# Patient Record
(demographics unavailable — no encounter records)

---

## 2024-12-12 NOTE — ASSESSMENT
[FreeTextEntry1] :  in summary, the patient is a 54-year-old woman who has had SVT in the past and had a cold ablation approximately 16 years ago.  She has had wide-complex tachycardia on monitoring a year ago.  She in general does have periods of sinus tachycardia.  She did stop taking methadone approximately a week ago and may indeed be having occasional withdrawal.  For now would continue to observe she will take metoprolol succinate 25 mg twice per day  If symptoms and tachycardia return may need to consider repeat monitoring with an eye towards repeat ablation

## 2024-12-12 NOTE — REASON FOR VISIT
[Arrhythmia/ECG Abnorrmalities] : arrhythmia/ECG abnormalities [FreeTextEntry1] :   Patient returns for follow-up.  For the most part she has been doing better but states she had an incident yesterday where she went to  her dog and all of a sudden found her heart rate at 192.  She states she sat down and after a few minutes it went back down

## 2024-12-16 NOTE — HISTORY OF PRESENT ILLNESS
[de-identified] : 54 year old female hx chronic nasal congestion  LCV 6/14/24 at which time Azelastine added to INCS

## 2024-12-17 NOTE — HISTORY OF PRESENT ILLNESS
[FreeTextEntry1] : Annual wellness exam. [de-identified] : Patient is a 54-year-old female who presents today for annual wellness exam.  Medical history significant for asthma, bladder atonia, chronic pain syndrome, chronic urinary tract infection on chronic suppressive therapy, migraines, multiple sclerosis and a history of vitamin B12 deficiency.  Patient also has history of palpitations/tachycardia/VT recently seen by cardiology consultation is in chart electrocardiogram in chart.

## 2024-12-17 NOTE — PHYSICAL EXAM
[No Acute Distress] : no acute distress [Well Nourished] : well nourished [Well Developed] : well developed [Well-Appearing] : well-appearing [Normal Voice/Communication] : normal voice/communication [Normal Sclera/Conjunctiva] : normal sclera/conjunctiva [PERRL] : pupils equal round and reactive to light [EOMI] : extraocular movements intact [Normal Outer Ear/Nose] : the outer ears and nose were normal in appearance [Normal Oropharynx] : the oropharynx was normal [Normal TMs] : both tympanic membranes were normal [Normal Nasal Mucosa] : the nasal mucosa was normal [No JVD] : no jugular venous distention [No Lymphadenopathy] : no lymphadenopathy [Supple] : supple [Thyroid Normal, No Nodules] : the thyroid was normal and there were no nodules present [No Respiratory Distress] : no respiratory distress  [No Accessory Muscle Use] : no accessory muscle use [Clear to Auscultation] : lungs were clear to auscultation bilaterally [Normal Rate] : normal rate  [Regular Rhythm] : with a regular rhythm [Normal S1, S2] : normal S1 and S2 [No Murmur] : no murmur heard [No Edema] : there was no peripheral edema [Soft] : abdomen soft [Non Tender] : non-tender [Non-distended] : non-distended [No Masses] : no abdominal mass palpated [Normal Bowel Sounds] : normal bowel sounds [No CVA Tenderness] : no CVA  tenderness [No Spinal Tenderness] : no spinal tenderness [No Joint Swelling] : no joint swelling [Grossly Normal Strength/Tone] : grossly normal strength/tone [No Rash] : no rash [Speech Grossly Normal] : speech grossly normal [Memory Grossly Normal] : memory grossly normal [Normal Affect] : the affect was normal [Alert and Oriented x3] : oriented to person, place, and time [Normal Mood] : the mood was normal [Normal Insight/Judgement] : insight and judgment were intact [Comprehensive Foot Exam Normal] : Right and left foot were examined and both feet are normal. No ulcers in either foot. Toes are normal and with full ROM.  Normal tactile sensation with monofilament testing throughout both feet [de-identified] : GYN [de-identified] : GYN (self cath) [de-identified] : weakness [de-identified] : unsteady

## 2024-12-17 NOTE — HEALTH RISK ASSESSMENT
[Very Good] : ~his/her~  mood as very good [Yes] : Yes [2 - 4 times a month (2 pts)] : 2-4 times a month (2 points) [1 or 2 (0 pts)] : 1 or 2 (0 points) [Never (0 pts)] : Never (0 points) [No] : In the past 12 months have you used drugs other than those required for medical reasons? No [Two or more falls in past year] : Patient reported two or more falls in the past year [Little interest or pleasure doing things] : 1) Little interest or pleasure doing things [Feeling down, depressed, or hopeless] : 2) Feeling down, depressed, or hopeless [0] : 2) Feeling down, depressed, or hopeless: Not at all (0) [PHQ-2 Negative - No further assessment needed] : PHQ-2 Negative - No further assessment needed [Audit-CScore] : 2 [[id number]] : 0 [Never] : Never [NO] : No [Patient reported mammogram was normal] : Patient reported mammogram was normal [Patient reported PAP Smear was normal] : Patient reported PAP Smear was normal [HIV test declined] : HIV test declined [Hepatitis C test offered] : Hepatitis C test offered [Change in mental status noted] : No change in mental status noted [Language] : denies difficulty with language [Behavior] : denies difficulty with behavior [Handling Complex Tasks] : denies difficulty handling complex tasks [Reasoning] : denies difficulty with reasoning [Spatial Ability and Orientation] : denies difficulty with spatial ability and orientation [None] : None [With Family] : lives with family [# of Members in Household ___] :  household currently consist of [unfilled] member(s) [On disability] : on disability [College] : College [] :  [# Of Children ___] : has [unfilled] children [Sexually Active] : sexually active [High Risk Behavior] : no high risk behavior [Feels Safe at Home] : Feels safe at home [Independent] : managing finances [Some assistance needed] : doing laundry [Reports changes in hearing] : Reports no changes in hearing [Reports changes in vision] : Reports no changes in vision [Reports normal functional visual acuity (ie: able to read med bottle)] : Reports poor functional visual acuity.  [Reports changes in dental health] : Reports no changes in dental health [Smoke Detector] : smoke detector [Carbon Monoxide Detector] : carbon monoxide detector [Safety elements used in home] : safety elements used in home [Seat Belt] :  uses seat belt [Sunscreen] : uses sunscreen [Travel to Developing Areas] : does not  travel to developing areas [TB Exposure] : is not being exposed to tuberculosis [Caregiver Concerns] : does not have caregiver concerns [MammogramDate] : 02/24 [PapSmearDate] : 02/24 [BoneDensityComments] : Order [ColonoscopyComments] : Patient is up-to-date with Cologuard which she will do every 3 years. [FreeTextEntry8] : Patient utilizes cane or walker [de-identified] : glasses [With Patient/Caregiver] : , with patient/caregiver [Reviewed no changes] : Reviewed, no changes [Designated Healthcare Proxy] : Designated healthcare proxy [Name: ___] : Health Care Proxy's Name: [unfilled]  [Relationship: ___] : Relationship: [unfilled] [Aggressive treatment] : aggressive treatment [I will adhere to the patient's wishes.] : I will adhere to the patient's wishes. [AdvancecareDate] : 12?24

## 2024-12-17 NOTE — REVIEW OF SYSTEMS
[Fever] : no fever [Chills] : no chills [Fatigue] : fatigue [Hot Flashes] : no hot flashes [Night Sweats] : no night sweats [Recent Change In Weight] : ~T no recent weight change [Earache] : no earache [Hearing Loss] : no hearing loss [Nosebleed] : no nosebleeds [Hoarseness] : no hoarseness [Nasal Discharge] : no nasal discharge [Sore Throat] : no sore throat [Chest Pain] : no chest pain [Palpitations] : no palpitations [Leg Claudication] : no leg claudication [Lower Ext Edema] : no lower extremity edema [Orthopnea] : no orthopnea [Paroxysmal Nocturnal Dyspnea] : no paroxysmal nocturnal dyspnea [Shortness Of Breath] : no shortness of breath [Wheezing] : no wheezing [Cough] : no cough [Dyspnea on Exertion] : no dyspnea on exertion [Back Pain] : back pain [Itching] : no itching [Mole Changes] : no mole changes [Nail Changes] : no nail changes [Hair Changes] : no hair changes [Skin Rash] : no skin rash [Headache] : headache [Unsteady Walking] : ataxia [Negative] : Heme/Lymph [FreeTextEntry5] : post fall, chest pain has resolved,palpitations resolved.

## 2024-12-17 NOTE — ASSESSMENT
[FreeTextEntry1] : Assessment and plan:  1.  History of allergic rhinitis, and asthma patient doing well with present medical management she is status post evaluation by both pulmonology and ENT.  2.  Bladder atonia patient continues to self cath unfortunately does have recurrent urinary tract infections and also yeast infections and patient is on chronic suppressive therapy and when she develops yeast infection she does have fluconazole available.  3.  Chronic pain syndrome which is multifactorial patient doing extremely well with lidocaine patch and in fact patient has discontinued methadone and doing well.  4.  Migraines presently the migraines are pretty well-controlled average approximately 1 time per month she does have medications available zolmitriptan 2.5 mg.  5.  Multiple sclerosis stable no acute exacerbations patient will continue cyclobenzaprine as needed, gabapentin on a daily basis and ruxience IV infusions every 6 months medications administered at infusion center.  Patient has responded appropriately to medical management and doing well.  6.  Palpitations well-controlled with beta-blocker metoprolol succinate ER 25 mg 1 daily.  Patient recently seen by cardiology reviewed most recent note reviewed electrocardiogram which showed normal sinus rhythm if palpitations persist patient will be scheduled for EP studies.  7.  Chronic UTI patient has chronic antibiotic suppression and is responding well to medical management.  8.  Patient is up-to-date with gynecological exam, mammogram and ultrasound of breast.  Patient did do Cologuard 1 year ago which was negative we will continue Cologuard patient will be due for follow-up in 2 years.  9.  Patient did have comprehensive blood work done as an outpatient reviewed with patient at next visit I will be obtaining comprehensive blood work which will include thyroid functions and lipid profile.

## 2025-02-18 NOTE — PHYSICAL EXAM
[No Acute Distress] : no acute distress [EOMI] : extraocular movements intact [Supple] : supple [Clear to Auscultation] : lungs were clear to auscultation bilaterally [Normal S1, S2] : normal S1 and S2 [No Edema] : there was no peripheral edema [Soft] : abdomen soft [Non Tender] : non-tender [No Rash] : no rash [Normal Gait] : normal gait [Normal Affect] : the affect was normal [de-identified] : pain on coccyx upon sitting

## 2025-02-18 NOTE — ASSESSMENT
[FreeTextEntry1] : Approximately 30 minutes was involved in this patient's care, including but not limited to preparing to see the patient, reviewing and obtaining the past and interval history, including medications, reviewing relevant testing, documenting clinical information, ordering of appropriate medications and testing, and coordinating medical care, including communicating with professionals involved in the care of the patient. Xray coccyx, sacrum tail bone relieving cushions acupuncture, OMT, physical therapy if not improving, evaluation with orthopedist.

## 2025-02-18 NOTE — HISTORY OF PRESENT ILLNESS
[FreeTextEntry8] : Ms. Leticia Meraz is a 54 female presents today with concerns pain in the tail bone, started this past Sunday. Pt reports difficulty delivery, several years ago, vacuum to forceps delivery. Pt reports possible injury to the coccyx bone. Pt reports lately pain, and inflammation. Pt reports using lidocaine patches. Pt advised today will cover with a course of mobic. Recommended as well, imaging of the the sacral coccyx and for completion will provided referral to orthopedist.

## 2025-03-17 NOTE — REASON FOR VISIT
[Arrhythmia/ECG Abnorrmalities] : arrhythmia/ECG abnormalities [Other: ____] : [unfilled] [FreeTextEntry1] : Parents for follow-up.  She is complaining of occasional episodes sometimes every day sometimes not for a few days where she gets lightheaded and feels her heart is racing.  She is unsure of how fast that is going.  Very often it is when she is standing it sometimes goes when she goes from sitting to standing or other times when she is just standing.  She states she does have compression stockings which she does not always wear them.  She does keep herself fairly well-hydrated.  She is on multiple medications for MS.

## 2025-03-17 NOTE — COUNSELING
[Fall prevention counseling provided] : Fall prevention counseling provided [Adequate lighting] : Adequate lighting [No throw rugs] : No throw rugs [Use proper foot wear] : Use proper foot wear [Use recommended devices] : Use recommended devices [Behavioral health counseling provided] : Behavioral health counseling provided [Sleep ___ hours/day] : Sleep [unfilled] hours/day [Engage in a relaxing activity] : Engage in a relaxing activity [Plan in advance] : Plan in advance [AUDIT-C Screening administered and reviewed] : AUDIT-C Screening administered and reviewed [None] : None [Good understanding] : Patient has a good understanding of lifestyle changes and steps needed to achieve self management goal [FreeTextEntry1] : Patient follows up regularly with physical therapy for gait training and balance training.

## 2025-03-17 NOTE — HISTORY OF PRESENT ILLNESS
[FreeTextEntry1] : Follow-up and disease management [de-identified] : Patient is a 54-year-old female who presents today for follow-up and disease management.  Patient states that she has been in her usual state of health she does have multiple underlying medical problems which include asthma, multiple sclerosis, chronic pain syndrome, unsteady gait, frequent falls, migraines, recurrent palpitations followed closely by cardiology.  Patient has blood work regularly done at [facility] in chart.  Presently the patient is awake alert and oriented x 3 in no acute distress calm and cooperative.  Patient follows up regularly with neurology, physical therapy, and cardiology.

## 2025-03-17 NOTE — REVIEW OF SYSTEMS
[Fatigue] : fatigue [Back Pain] : back pain [Headache] : headache [Unsteady Walking] : ataxia [Negative] : Heme/Lymph [Fever] : no fever [Chills] : no chills [Hot Flashes] : no hot flashes [Night Sweats] : no night sweats [Recent Change In Weight] : ~T no recent weight change [Earache] : no earache [Hearing Loss] : no hearing loss [Nosebleed] : no nosebleeds [Hoarseness] : no hoarseness [Nasal Discharge] : no nasal discharge [Sore Throat] : no sore throat [Chest Pain] : no chest pain [Palpitations] : no palpitations [Leg Claudication] : no leg claudication [Lower Ext Edema] : no lower extremity edema [Orthopnea] : no orthopnea [Paroxysmal Nocturnal Dyspnea] : no paroxysmal nocturnal dyspnea [Shortness Of Breath] : no shortness of breath [Wheezing] : no wheezing [Cough] : no cough [Dyspnea on Exertion] : no dyspnea on exertion [Itching] : no itching [Mole Changes] : no mole changes [Nail Changes] : no nail changes [Hair Changes] : no hair changes [Skin Rash] : no skin rash [FreeTextEntry5] : post fall, chest pain has resolved,palpitations resolved.

## 2025-03-17 NOTE — HEALTH RISK ASSESSMENT
[Yes] : Yes [2 - 4 times a month (2 pts)] : 2-4 times a month (2 points) [1 or 2 (0 pts)] : 1 or 2 (0 points) [Never (0 pts)] : Never (0 points) [No] : In the past 12 months have you used drugs other than those required for medical reasons? No [Two or more falls in past year] : Patient reported two or more falls in the past year [Little interest or pleasure doing things] : 1) Little interest or pleasure doing things [Feeling down, depressed, or hopeless] : 2) Feeling down, depressed, or hopeless [0] : 2) Feeling down, depressed, or hopeless: Not at all (0) [PHQ-2 Negative - No further assessment needed] : PHQ-2 Negative - No further assessment needed [With Patient/Caregiver] : , with patient/caregiver [Reviewed no changes] : Reviewed, no changes [Designated Healthcare Proxy] : Designated healthcare proxy [Name: ___] : Health Care Proxy's Name: [unfilled]  [Relationship: ___] : Relationship: [unfilled] [Aggressive treatment] : aggressive treatment [I will adhere to the patient's wishes.] : I will adhere to the patient's wishes. [Never] : Never [de-identified] : Cardiology [Audit-CScore] : 2 [[id number]] : 0 [AdvancecareDate] : 03/25

## 2025-03-17 NOTE — CURRENT MEDS
[Takes medication as prescribed] : takes [None] : Patient does not have any barriers to medication adherence [Yes] : Reviewed medication list for presence of high-risk medications. [Muscle Relaxants] : muscle relaxants [Other____] : [unfilled]

## 2025-03-17 NOTE — ASSESSMENT
[FreeTextEntry1] : Assessment and plan:  1.  History of allergic rhinitis, and asthma patient doing well with present medical management she is status post evaluation by both pulmonology and ENT.  2.  Bladder atonia patient continues to self cath unfortunately does have recurrent urinary tract infections and also yeast infections and patient is on chronic suppressive therapy and when she develops yeast infection she does have fluconazole available.  3.  Chronic pain syndrome which is multifactorial patient doing extremely well with lidocaine patch and in fact patient has discontinued methadone and doing well.  She does continue with gabapentin.  4.  Migraines presently the migraines are pretty well-controlled average approximately 1 time per month she does have medications available zolmitriptan 2.5 mg.  5.  Multiple sclerosis stable no acute exacerbations patient will continue cyclobenzaprine as needed, gabapentin on a daily basis and ruxience IV infusions every 6 months medications administered at infusion center.  Patient has responded appropriately to medical management and doing well.  6.  Palpitations well-controlled with beta-blocker metoprolol succinate ER 25 mg 1 daily.  Patient recently seen by cardiology reviewed most recent note reviewed electrocardiogram which showed normal sinus rhythm if palpitations persist patient will be scheduled for EP studies.  7.  Chronic UTI patient has chronic antibiotic suppression and is responding well to medical management.  8.  Patient is up-to-date with gynecological exam, mammogram and ultrasound of breast.  Patient did do Cologuard 1 year ago which was negative we will continue Cologuard patient will be due for follow-up in 2 years.  9.  Patient did have comprehensive blood work done as an outpatient reviewed with patient.  Medication reconciliation done all medications that required refills were sent to pharmacy.  Total time spent face-to-face and non-face-to-face time 45 minutes the majority of which was spent on counseling and coordination of care.

## 2025-03-17 NOTE — PHYSICAL EXAM
[No Acute Distress] : no acute distress [Well Nourished] : well nourished [Well Developed] : well developed [Well-Appearing] : well-appearing [Normal Voice/Communication] : normal voice/communication [Normal Sclera/Conjunctiva] : normal sclera/conjunctiva [PERRL] : pupils equal round and reactive to light [EOMI] : extraocular movements intact [Normal Outer Ear/Nose] : the outer ears and nose were normal in appearance [Normal Oropharynx] : the oropharynx was normal [Normal TMs] : both tympanic membranes were normal [Normal Nasal Mucosa] : the nasal mucosa was normal [No JVD] : no jugular venous distention [No Lymphadenopathy] : no lymphadenopathy [Supple] : supple [Thyroid Normal, No Nodules] : the thyroid was normal and there were no nodules present [No Respiratory Distress] : no respiratory distress  [No Accessory Muscle Use] : no accessory muscle use [Clear to Auscultation] : lungs were clear to auscultation bilaterally [Normal Rate] : normal rate  [Regular Rhythm] : with a regular rhythm [Normal S1, S2] : normal S1 and S2 [No Murmur] : no murmur heard [No Edema] : there was no peripheral edema [Soft] : abdomen soft [Non Tender] : non-tender [Non-distended] : non-distended [No Masses] : no abdominal mass palpated [Normal Bowel Sounds] : normal bowel sounds [No CVA Tenderness] : no CVA  tenderness [No Spinal Tenderness] : no spinal tenderness [No Joint Swelling] : no joint swelling [Grossly Normal Strength/Tone] : grossly normal strength/tone [No Rash] : no rash [Speech Grossly Normal] : speech grossly normal [Memory Grossly Normal] : memory grossly normal [Normal Affect] : the affect was normal [Alert and Oriented x3] : oriented to person, place, and time [Normal Mood] : the mood was normal [Normal Insight/Judgement] : insight and judgment were intact [Comprehensive Foot Exam Normal] : Right and left foot were examined and both feet are normal. No ulcers in either foot. Toes are normal and with full ROM.  Normal tactile sensation with monofilament testing throughout both feet [de-identified] : GYN (self cath) [de-identified] : GYN [de-identified] : weakness [de-identified] : unsteady

## 2025-03-17 NOTE — ASSESSMENT
[FreeTextEntry1] : In summary, the patient is a 54-year-old woman with multiple sclerosis who appears to have some tachycardia on standing.  Interestingly she goes from about a heart rate of 76-84 and she does get quite symptomatic even with that slight increase.  Her blood pressure did drop from 99/80 to 80/60  I have advised the patient to wear her support stockings as well as keep well-hydrated.  For now we will start midodrine 2.5 mg twice per day in hopes of improving orthostasis

## 2025-06-23 NOTE — HISTORY OF PRESENT ILLNESS
[FreeTextEntry1] : Patient is here for follow-up and disease management. [de-identified] : Patient is a 54-year-old female who presents today for follow-up and disease management patient states that she is in her usual state of health and actually feeling well.  Patient does have a long and complicated medical history which includes allergic rhinitis, bladder atonia, chronic pain syndrome, chronic urinary tract infection, migraines, multiple sclerosis presently well-controlled with present medical management recently diagnosed with orthostatic hypotension most recent cardiology evaluation in chart.  Presently the patient is awake alert and oriented x 3 no acute distress calm and cooperative.

## 2025-06-23 NOTE — HEALTH RISK ASSESSMENT
[Yes] : Yes [2 - 4 times a month (2 pts)] : 2-4 times a month (2 points) [1 or 2 (0 pts)] : 1 or 2 (0 points) [Never (0 pts)] : Never (0 points) [No] : In the past 12 months have you used drugs other than those required for medical reasons? No [No falls in past year] : Patient reported no falls in the past year [Little interest or pleasure doing things] : 1) Little interest or pleasure doing things [Feeling down, depressed, or hopeless] : 2) Feeling down, depressed, or hopeless [0] : 2) Feeling down, depressed, or hopeless: Not at all (0) [PHQ-2 Negative - No further assessment needed] : PHQ-2 Negative - No further assessment needed [With Patient/Caregiver] : , with patient/caregiver [Reviewed no changes] : Reviewed, no changes [Designated Healthcare Proxy] : Designated healthcare proxy [Name: ___] : Health Care Proxy's Name: [unfilled]  [Relationship: ___] : Relationship: [unfilled] [Aggressive treatment] : aggressive treatment [I will adhere to the patient's wishes.] : I will adhere to the patient's wishes. [Never] : Never [Audit-CScore] : 2 [[id number]] : 0 [AdvancecareDate] : 06/25

## 2025-06-23 NOTE — ASSESSMENT
[FreeTextEntry1] : Assessment and plan:  1.  History of allergic rhinitis, and asthma patient doing well with present medical management she is status post evaluation by both pulmonology and ENT.  2.  Bladder atonia patient continues to self cath unfortunately does have recurrent urinary tract infections and also yeast infections and patient is on chronic suppressive therapy and when she develops yeast infection she does have fluconazole available.  3.  Chronic pain syndrome which is multifactorial patient doing extremely well with lidocaine patch and in fact patient has discontinued methadone and doing well.  She does continue with gabapentin.  4.  Migraines presently the migraines are pretty well-controlled average approximately 1 time per month she does have medications available zolmitriptan 2.5 mg.  5.  Multiple sclerosis stable no acute exacerbations patient will continue cyclobenzaprine as needed, gabapentin on a daily basis and ruxience IV infusions every 6 months medications administered at infusion center.  Patient has responded appropriately to medical management and doing well.  6.  Palpitations well-controlled with beta-blocker metoprolol succinate ER 25 mg 1 daily.  Patient recently seen by cardiology reviewed most recent note reviewed electrocardiogram which showed normal sinus rhythm if palpitations persist patient will be scheduled for EP studies. A.  Patient recently seen by cardiology diagnosed with orthostatic hypotension and started on midodrine patient doing well and tolerating.  7.  Chronic UTI patient has chronic antibiotic suppression and is responding well to medical management.  8.  Patient is up-to-date with gynecological exam, mammogram and ultrasound of breast.  Patient did do Cologuard 1 year ago which was negative we will continue Cologuard patient will be due for follow-up in 2 years.  9.  Patient did have comprehensive blood work done as an outpatient reviewed with patient.  Medication reconciliation done all medications that required refills were sent to pharmacy.  Total time spent face-to-face and non-face-to-face time 45 minutes the majority of which was spent on counseling and coordination of care.

## 2025-06-23 NOTE — PHYSICAL EXAM
[No Acute Distress] : no acute distress [Well Nourished] : well nourished [Well Developed] : well developed [Well-Appearing] : well-appearing [Normal Voice/Communication] : normal voice/communication [Normal Sclera/Conjunctiva] : normal sclera/conjunctiva [PERRL] : pupils equal round and reactive to light [EOMI] : extraocular movements intact [Normal Outer Ear/Nose] : the outer ears and nose were normal in appearance [Normal Oropharynx] : the oropharynx was normal [Normal TMs] : both tympanic membranes were normal [Normal Nasal Mucosa] : the nasal mucosa was normal [No JVD] : no jugular venous distention [No Lymphadenopathy] : no lymphadenopathy [Supple] : supple [Thyroid Normal, No Nodules] : the thyroid was normal and there were no nodules present [No Respiratory Distress] : no respiratory distress  [No Accessory Muscle Use] : no accessory muscle use [Clear to Auscultation] : lungs were clear to auscultation bilaterally [Normal Rate] : normal rate  [Regular Rhythm] : with a regular rhythm [Normal S1, S2] : normal S1 and S2 [No Murmur] : no murmur heard [No Edema] : there was no peripheral edema [Soft] : abdomen soft [Non Tender] : non-tender [Non-distended] : non-distended [No Masses] : no abdominal mass palpated [Normal Bowel Sounds] : normal bowel sounds [No CVA Tenderness] : no CVA  tenderness [No Spinal Tenderness] : no spinal tenderness [No Joint Swelling] : no joint swelling [Grossly Normal Strength/Tone] : grossly normal strength/tone [No Rash] : no rash [Speech Grossly Normal] : speech grossly normal [Memory Grossly Normal] : memory grossly normal [Normal Affect] : the affect was normal [Alert and Oriented x3] : oriented to person, place, and time [Normal Mood] : the mood was normal [Normal Insight/Judgement] : insight and judgment were intact [Comprehensive Foot Exam Normal] : Right and left foot were examined and both feet are normal. No ulcers in either foot. Toes are normal and with full ROM.  Normal tactile sensation with monofilament testing throughout both feet [de-identified] : GYN [de-identified] : GYN (self cath) [de-identified] : weakness [de-identified] : unsteady